# Patient Record
Sex: FEMALE | Race: BLACK OR AFRICAN AMERICAN | NOT HISPANIC OR LATINO | Employment: UNEMPLOYED | URBAN - METROPOLITAN AREA
[De-identification: names, ages, dates, MRNs, and addresses within clinical notes are randomized per-mention and may not be internally consistent; named-entity substitution may affect disease eponyms.]

---

## 2019-08-23 ENCOUNTER — OFFICE VISIT (OUTPATIENT)
Dept: OBGYN | Facility: CLINIC | Age: 64
End: 2019-08-23
Attending: OBSTETRICS & GYNECOLOGY
Payer: COMMERCIAL

## 2019-08-23 VITALS — SYSTOLIC BLOOD PRESSURE: 108 MMHG | HEART RATE: 65 BPM | DIASTOLIC BLOOD PRESSURE: 74 MMHG | WEIGHT: 156.7 LBS

## 2019-08-23 DIAGNOSIS — Z12.4 CERVICAL CANCER SCREENING: Primary | ICD-10-CM

## 2019-08-23 PROCEDURE — 87624 HPV HI-RISK TYP POOLED RSLT: CPT | Performed by: OBSTETRICS & GYNECOLOGY

## 2019-08-23 PROCEDURE — G0145 SCR C/V CYTO,THINLAYER,RESCR: HCPCS | Performed by: OBSTETRICS & GYNECOLOGY

## 2019-08-23 PROCEDURE — G0463 HOSPITAL OUTPT CLINIC VISIT: HCPCS

## 2019-08-23 PROCEDURE — G0123 SCREEN CERV/VAG THIN LAYER: HCPCS | Performed by: OBSTETRICS & GYNECOLOGY

## 2019-08-23 PROCEDURE — G0476 HPV COMBO ASSAY CA SCREEN: HCPCS | Performed by: OBSTETRICS & GYNECOLOGY

## 2019-08-23 RX ORDER — CETIRIZINE HYDROCHLORIDE 10 MG/1
10 TABLET ORAL DAILY
COMMUNITY
End: 2023-12-05

## 2019-08-23 NOTE — LETTER
9/18/2019         Sharon Mendoza   [address]  [address]  [facility] [address]        Dear Ms. Mendoza:    The results of your recent test(s) listed below were normal.     Results for orders placed or performed in visit on 08/23/19   HPV High Risk Types DNA Cervical   Result Value Ref Range    HPV Source [facility]     HPV 16 DNA Negative NEG^Negative    HPV 18 DNA Negative NEG^Negative    Other HR HPV Negative NEG^Negative    Final Diagnosis This patient's sample is negative for HPV DNA.     Specimen Description Cervical Cells    A pap thin layer screen   Result Value Ref Range    PAP NIL     Copath Report         Patient Name: SHARON MENDOZA  MR#: [id number]  Specimen #: [id number]  Collected: 8/23/2019  Received: 8/26/2019  Reported: 8/30/2019 10:00  Ordering Phy(s): ESTRELLA OLSON    For improved result formatting, select 'View Enhanced Report Format' under   Linked Documents section.    SPECIMEN/STAIN PROCESS:  Pap thin layer prep screening (Sure[facility])       Pap-Cyto x 1, HPV ordered x 1    SOURCE: Cervical, endocervical  ----------------------------------------------------------------   Pap thin layer prep screening (Sure[facility])  SPECIMEN ADEQUACY:  Satisfactory for evaluation.  Specimen was unable to be imaged on the   Project WBS Slide .  -Transformation zone component absent.    CYTOLOGIC INTERPRETATION:    Negative for intraepithelial lesion or malignancy    Electronically signed out by:  ANGEL Elliott (ASCP)    CLINICAL HISTORY:    Post Menopausal,    Papanicolaou Test Limitations:  Cervical cytology is a screening test with   limited sensitivity; regular  screening is cri tical for cancer prevention; Pap tests are primarily   effective for the diagnosis/prevention of  squamous cell carcinoma, not adenocarcinomas or other cancers.    The technical component of this testing was completed at the [facility], with the professional component  performed   at the [facility], [address],   [place] [address] ([phone])    COLLECTION SITE:  Client:  [facility]  Location: [place] (B)             Please note that test explanations are brief and do not reflect all diagnostic uses.  If you have any questions or concerns, please call the clinic at [phone].      Sincerely,      Leah Reynaga CNM

## 2019-08-23 NOTE — PROGRESS NOTES
Pelvic Pain Visit Note    Sharon Mendoza is a 64 year old y/o  (1  age 22 killed in [place]); all vaginal deliveries. She is here with her .  Her babies were full term; no weight known.  She denies VB. No abnormal discharge. She is not sexually acitve.She has seen a GYN about  at [facility] and reports being here as well . She goes to [facility].She reports feeling pain inside her vagina; like I'm about to pee. She also has lower abdominal pain; this seems to radiate up from her vagina. This has been going on for a couple of months. She also  has spontaneous loss of urine with urgency.  She has FERMÍN sometimes. This has been going on for years and she has accepted this part. She has no problem evacuating BM's.  She also notes a lump near her vagina. She hasn't had a pap for many years..  She is not comfortable telling me when she had intercourse last.  She is not currently sexually active.     ROS: not enough time to finish  Current Outpatient Medications   Medication Sig Dispense Refill     cetirizine (ZYRTEC) 10 MG tablet Take 10 mg by mouth daily       sulindac (CLINORIL) 200 MG tablet Take 1 tablet by mouth 2 times daily (with meals). 24 tablet 1     ACETAMINOPHEN PO Take  by mouth.       No Known Allergies    Past Medical History:   Diagnosis Date     Gastro-oesophageal reflux disease      No past surgical history on file.  No family history on file.  Social History     Socioeconomic History     Marital status:      Spouse name: Not on file     Number of children: Not on file     Years of education: Not on file     Highest education level: Not on file   Occupational History     Not on file   Social Needs     Financial resource strain: Not on file     Food insecurity:     Worry: Not on file     Inability: Not on file     Transportation needs:     Medical: Not on file     Non-medical: Not on file   Tobacco Use     Smoking status: Never Smoker     Smokeless tobacco: Never Used  "  Substance and Sexual Activity     Alcohol use: No     Drug use: No     Sexual activity: Not on file   Lifestyle     Physical activity:     Days per week: Not on file     Minutes per session: Not on file     Stress: Not on file   Relationships     Social connections:     Talks on phone: Not on file     Gets together: Not on file     Attends [employer] service: Not on file     Active member of club or organization: Not on file     Attends meetings of clubs or organizations: Not on file     Relationship status: Not on file     Intimate partner violence:     Fear of current or ex partner: Not on file     Emotionally abused: Not on file     Physically abused: Not on file     Forced sexual activity: Not on file   Other Topics Concern     Not on file   Social History Narrative     Not on file     Abd: soft, NT  Exam: Fairly gaping introitus, white discharge seen. Small/moderate cystocele and small rectocele note. The \"lump\" of her concern appears to be a torn labia minora ?from birthing.  Pelvic exam: normal cervix without lesions or tenderness, uterus normal size, adenxa normal in size without tenderness, pap smear done      Assessment/Plan: Sharon Mendoza is a 64 year old y/o with vaginal discomfort radiating to her lower abdomen. I feel that this is most likely due to her cystocele.  She was given options of 1)do nothing 2)pessary 3)surgery.  She really has learned to live with the spontaneous loss of urine; she was just concerned about her new vaginal symptoms which I tried to reassure her about.  She will probably choose option1; but will RTO if her symptoms worsen.    Check Pap smear.    I spent a total of 30  minutes face to face with Sharon during today's office visit. Over 50% of this time was spent counseling the patient and/or coordinating care regarding her concerns.      "

## 2019-08-23 NOTE — LETTER
2019       RE: Sharon Mendoza  [address]  [address]  [facility] [address]     Dear Colleague,    Thank you for referring your patient, Sharon Mendoza, to the [facility] at [facility]. Please see a copy of my visit note below.    Pelvic Pain Visit Note    Sharon Mendoza is a 64 year old y/o  (1  age 22 killed in [place]); all vaginal deliveries. She is here with her .  Her babies were full term; no weight known.  She denies VB. No abnormal discharge. She is not sexually acitve.She has seen a GYN about  at [facility] and reports being here as well . She goes to [facility].She reports feeling pain inside her vagina; like I'm about to pee. She also has lower abdominal pain; this seems to radiate up from her vagina. This has been going on for a couple of months. She also  has spontaneous loss of urine with urgency.  She has FERMÍN sometimes. This has been going on for years and she has accepted this part. She has no problem evacuating BM's.  She also notes a lump near her vagina. She hasn't had a pap for many years..  She is not comfortable telling me when she had intercourse last.  She is not currently sexually active.     ROS: not enough time to finish  Current Outpatient Medications   Medication Sig Dispense Refill     cetirizine (ZYRTEC) 10 MG tablet Take 10 mg by mouth daily       sulindac (CLINORIL) 200 MG tablet Take 1 tablet by mouth 2 times daily (with meals). 24 tablet 1     ACETAMINOPHEN PO Take  by mouth.       No Known Allergies    Past Medical History:   Diagnosis Date     Gastro-oesophageal reflux disease      No past surgical history on file.  No family history on file.  Social History     Socioeconomic History     Marital status:      Spouse name: Not on file     Number of children: Not on file     Years of education: Not on file     Highest education level: Not on file   Occupational History     Not on  "file   Social Needs     Financial resource strain: Not on file     Food insecurity:     Worry: Not on file     Inability: Not on file     Transportation needs:     Medical: Not on file     Non-medical: Not on file   Tobacco Use     Smoking status: Never Smoker     Smokeless tobacco: Never Used   Substance and Sexual Activity     Alcohol use: No     Drug use: No     Sexual activity: Not on file   Lifestyle     Physical activity:     Days per week: Not on file     Minutes per session: Not on file     Stress: Not on file   Relationships     Social connections:     Talks on phone: Not on file     Gets together: Not on file     Attends Hoahaoism service: Not on file     Active member of club or organization: Not on file     Attends meetings of clubs or organizations: Not on file     Relationship status: Not on file     Intimate partner violence:     Fear of current or ex partner: Not on file     Emotionally abused: Not on file     Physically abused: Not on file     Forced sexual activity: Not on file   Other Topics Concern     Not on file   Social History Narrative     Not on file     Abd: soft, NT  Exam: Fairly gaping introitus, white discharge seen. Small/moderate cystocele and small rectocele note. The \"lump\" of her concern appears to be a torn labia minora ?from birthing.  Pelvic exam: normal cervix without lesions or tenderness, uterus normal size, adenxa normal in size without tenderness, pap smear done    Assessment/Plan: Sharon Mendoza is a 64 year old y/o with vaginal discomfort radiating to her lower abdomen. I feel that this is most likely due to her cystocele.  She was given options of 1)do nothing 2)pessary 3)surgery.  She really has learned to live with the spontaneous loss of urine; she was just concerned about her new vaginal symptoms which I tried to reassure her about.  She will probably choose option1; but will RTO if her symptoms worsen.    Check Pap smear.    I spent a total of 30  minutes face to " face with Sharon during today's office visit. Over 50% of this time was spent counseling the patient and/or coordinating care regarding her concerns.      Again, thank you for allowing me to participate in the care of your patient.      Sincerely,    Chaz Green MD

## 2019-08-29 LAB
FINAL DIAGNOSIS: NORMAL
HPV HR 12 DNA CVX QL NAA+PROBE: NEGATIVE
HPV16 DNA SPEC QL NAA+PROBE: NEGATIVE
HPV18 DNA SPEC QL NAA+PROBE: NEGATIVE
SPECIMEN DESCRIPTION: NORMAL
SPECIMEN SOURCE CVX/VAG CYTO: NORMAL

## 2019-08-30 LAB
COPATH REPORT: NORMAL
PAP: NORMAL

## 2022-10-10 ENCOUNTER — TRANSCRIBE ORDERS (OUTPATIENT)
Dept: OTHER | Age: 67
End: 2022-10-10

## 2022-10-10 DIAGNOSIS — N32.81 OVERACTIVE BLADDER: Primary | ICD-10-CM

## 2023-06-01 ENCOUNTER — TRANSCRIBE ORDERS (OUTPATIENT)
Dept: OTHER | Age: 68
End: 2023-06-01

## 2023-06-01 DIAGNOSIS — R39.15 URINARY URGENCY: ICD-10-CM

## 2023-06-01 DIAGNOSIS — R32 LEAKING OF URINE: ICD-10-CM

## 2023-06-01 DIAGNOSIS — N39.3 STRESS INCONTINENCE: Primary | ICD-10-CM

## 2023-06-01 DIAGNOSIS — R33.9 INCOMPLETE EMPTYING OF BLADDER: ICD-10-CM

## 2023-07-18 ENCOUNTER — THERAPY VISIT (OUTPATIENT)
Dept: PHYSICAL THERAPY | Facility: CLINIC | Age: 68
End: 2023-07-18
Payer: COMMERCIAL

## 2023-07-18 DIAGNOSIS — R32 URINARY INCONTINENCE: Primary | ICD-10-CM

## 2023-07-18 DIAGNOSIS — M62.89 PELVIC FLOOR DYSFUNCTION: ICD-10-CM

## 2023-07-18 PROCEDURE — 97161 PT EVAL LOW COMPLEX 20 MIN: CPT | Mod: GP | Performed by: PHYSICAL THERAPIST

## 2023-07-18 PROCEDURE — 97110 THERAPEUTIC EXERCISES: CPT | Mod: GP | Performed by: PHYSICAL THERAPIST

## 2023-07-18 PROCEDURE — 97530 THERAPEUTIC ACTIVITIES: CPT | Mod: GP | Performed by: PHYSICAL THERAPIST

## 2023-07-18 PROCEDURE — 97535 SELF CARE MNGMENT TRAINING: CPT | Mod: GP | Performed by: PHYSICAL THERAPIST

## 2023-07-18 NOTE — PROGRESS NOTES
PHYSICAL THERAPY EVALUATION  Type of Visit: Evaluation    See electronic medical record for Abuse and Falls Screening details.    Subjective       Presenting condition or subjective complaint: urine leakagePatient reports with complaints of urinary incontinence that has been ongoing for about 10 years but is gradually getting worse.  She reports she may have prolapse, but denies heaviness or pressure in pelvis.  She has urinary incontinence that is worsening and happens randomly, once she gets urge she can't stop the urine flow.  She does sometimes have some urinary leakage in the bed about twice at night and one-two at night.  Denies bowel issues.  Denies sexual dysfunction.  Denies pain, except with full bladder.   Date of onset: 06/01/23 (provider referral date)    Relevant medical history: Bladder or bowel problems; Incontinence; Menopause; Neck injury   Dates & types of surgery:  6 vaginal deliveries     Prior diagnostic imaging/testing results:       Prior therapy history for the same diagnosis, illness or injury:        Prior Level of Function  Transfers: Independent  Ambulation: Independent  ADL: Independent  IADL: Childcare, Driving, Laundry, Meal preparation, Medication management, Work, Yard work    Living Environment  Social support: Alone   Type of home: 1 level   Stairs to enter the home: No       Ramp: No   Stairs inside the home: No       Help at home: Home management tasks (cooking, cleaning)  Equipment owned:       Employment:      Hobbies/Interests: walking, reading    Patient goals for therapy:      Pain assessment: See objective evaluation for additional pain details     Objective      PELVIC EVALUATION  ADDITIONAL HISTORY:  Sex assigned at birth: Female  Gender identity: Female    Pronouns: She/Her Hers      Bladder History:  Feels bladder filling: Yes  Triggers for feeling of inability to wait to go to the bathroom: No    How long can you wait to urinate:    Gets up at night to urinate:       Can stop the flow of urine when urinating:    Volume of urine usually released: Large   Other issues: Dribbling after urinating  Number of bladder infections in last 12 months:    Fluid intake per day:        Medications taken for bladder:       Activities causing urine leak: Hurrying to the bathroom due to a strong urge to urinate (pee)    Amount of urine typically leaked: large, runs down leg  Pads used to help with leaking: No        Bowel History:  Frequency of bowel movement:    Consistency of stool: Soft-formed    Ignores the urge to defecate: No  Other bowel issues:    Length of time spent trying to have a bowel movement:      Sexual Function History:  Sexual orientation:      Sexually active:    Lubrication used:      Pelvic pain:      Pain or difficulty with orgasms/erection/ejaculation:      State of menopause:    Hormone medications: No      Are you currently pregnant: No, Number of deliveries: 6, If you have delivered before, did you have any of these issues during delivery: Vaginal delivery, Have you been diagnosed with pelvic prolapse or abdominal separation: No, Do you get regular exercise: No    Discussed reason for referral regarding pelvic health needs and external/internal pelvic floor muscle examination with patient/guardian.  Opportunity provided to ask questions and verbal consent for assessment and intervention was given.    PAIN: Pain Level at Rest: 0/10  POSTURE: Standing Posture: Lordosis increased  LUMBAR SCREEN:   (Degrees) Left AROM Left PROM  Right AROM Right PROM   Hip Flexion       Hip Extension       Hip Abduction       Hip Adduction       Hip Internal Rotation       Hip External Rotation       Knee Flexion       Knee Extension       Lumbar Side glide 75% 75%   Lumbar Flexion 50%    Lumbar Extension Limited by lumbar extension posture    Pain:   End feel:   HIP SCREEN:  Strength:   Pain: - none + mild ++ moderate +++ severe  Strength Scale: 0-5/5 Left Right   Hip Flexion 4+ 4+    Hip Extension     Hip Abduction 4- 4   Hip Adduction     Hip Internal Rotation 5- 5-   Hip External Rotation 5- 5-   Knee Flexion 5 5   Knee Extension 5 5      Functional Strength Testing: SLS: > 10 sec each side     PELVIC/SI SCREEN:  Standing Forward Bend: negative     PAIN PROVOCATION TEST: WNL  PELVIS/SI SPECIAL TESTS: WNL  BREATHING SYMMETRY: Decreased rib cage mobility    PELVIC EXAM  External Visual Inspection:  At rest: Elevated perineal body  With voluntary pelvic floor contraction: Perineal elevation  Relaxation of PFM: Partial/delayed relaxation  With intra-abdominal pressure: Cough: Perineal descent  Valsalva: Perineal elevation  Bearing down as defecation: Perineal elevation    Integumentary:   Introitus: Unremarkable    External Digital Palpation per Perineum:   Ischiocavernosis: Unremarkable  Bulbo cavernosis: Unremarkable, Tightness  Transverse perineal: Unremarkable  Levator ani: Tightness, Tenderness    Scar:   Location/Type:   Mobility:     Internal Digital Palpation:  Per Vagina:  Tenderness  Tone: high  Digital Muscle Performance: P (Power): 2  E (Endurance): 3  F (Fast Twitch): 5  Compensations: Gluts  Relaxation Post-Contraction: Partial/delayed relaxation    Per Rectum:        Pelvic Organ Prolapse:       ABDOMINAL ASSESSMENT  Diastasis Rectus Abdominis (HEATHER):      Abdominal Activation/Strength:     Scar:   Location/Type:   Mobility:     Fascial Tension/Restriction/Tone:     BIOFEEDBACK:  Position:   Surface Electrodes:     Abdominals:     Perianals:       DERMATOMES:   DTR S:     Assessment & Plan   CLINICAL IMPRESSIONS  Medical Diagnosis: Stress incontinence (N39.3), Leaking of urine (R32), Urinary urgency (R39.15), Incomplete emptying of bladder (R33.9)    Treatment Diagnosis: pelvic floor dysfunction   Impression/Assessment: Patient is a 68 year old female with pelvic floor complaints.  The following significant findings have been identified: Pain, Decreased ROM/flexibility, Decreased  joint mobility, Decreased strength, Impaired muscle performance, and Decreased activity tolerance. These impairments interfere with their ability to perform self care tasks, work tasks, household mobility, and community mobility as compared to previous level of function.     Clinical Decision Making (Complexity):  Clinical Presentation: Stable/Uncomplicated  Clinical Presentation Rationale: based on medical and personal factors listed in PT evaluation  Clinical Decision Making (Complexity): Low complexity    PLAN OF CARE  Treatment Interventions:  Modalities: Biofeedback  Interventions: Manual Therapy, Neuromuscular Re-education, Therapeutic Activity, Therapeutic Exercise, Self-Care/Home Management    Long Term Goals     PT Goal 1  Goal Identifier: STG 1  Goal Description: Patient will decrease episodes of urinary incontinence to 0 times in a week  Rationale: to maximize safety and independence with performance of ADLs and functional tasks  Target Date: 10/10/23      Frequency of Treatment: every 2 weeks  Duration of Treatment: 12 weeks    Recommended Referrals to Other Professionals:  No further referral needed   Education Assessment:   Learner/Method: Patient;Listening;Demonstration    Risks and benefits of evaluation/treatment have been explained.   Patient/Family/caregiver agrees with Plan of Care.     Evaluation Time:     PT Eval, Low Complexity Minutes (74988): 25   Present: Yes: Language: Samoan , ID Number/Identifier: [id number]     Signing Clinician: Mechelle Rick, PT      [facility]                                                                                   OUTPATIENT PHYSICAL THERAPY      PLAN OF TREATMENT FOR OUTPATIENT REHABILITATION   Patient's Last Name, First Name, Sharon Monterroso YOB: 1955   Provider's Name   [facility]   Medical Record No.  [id number]     Onset Date: 06/01/23 (provider referral date)   Start of Care Date: 07/18/23     Medical Diagnosis:  Stress incontinence (N39.3), Leaking of urine (R32), Urinary urgency (R39.15), Incomplete emptying of bladder (R33.9)      PT Treatment Diagnosis:  pelvic floor dysfunction Plan of Treatment  Frequency/Duration: every 2 weeks/ 12 weeks    Certification date from 07/18/23 to 10/10/23         See note for plan of treatment details and functional goals     Mechelle Rick, PT                         I CERTIFY THE NEED FOR THESE SERVICES FURNISHED UNDER        THIS PLAN OF TREATMENT AND WHILE UNDER MY CARE .             Physician Signature               Date    X_____________________________________________________                    Referring Provider:  Raul Moran      Initial Assessment  See Epic Evaluation- Start of Care Date: 07/18/23                  [facility]                                                                                   OUTPATIENT PHYSICAL THERAPY      PLAN OF TREATMENT FOR OUTPATIENT REHABILITATION   Patient's Last Name, First Name, Sharon Monterroso YOB: 1955   Provider's Name   [facility]   Medical Record No.  [id number]     Onset Date: 06/01/23 (provider referral date)  Start of Care Date: 07/18/23     Medical Diagnosis:  Stress incontinence (N39.3), Leaking of urine (R32), Urinary urgency (R39.15), Incomplete emptying of bladder (R33.9)      PT Treatment Diagnosis:  pelvic floor dysfunction Plan of Treatment  Frequency/Duration: every 2 weeks/ 12 weeks    Certification date from 07/18/23 to 10/10/23         See note for plan of treatment details and functional goals     Mechelle Rick, RUCHI                         I CERTIFY THE NEED FOR THESE SERVICES FURNISHED UNDER        THIS PLAN OF TREATMENT AND WHILE UNDER MY CARE .             Physician Signature               Date    X_____________________________________________________                    Referring  Provider:  Raul Moran      Initial Assessment  See Epic Evaluation- Start of Care Date: 07/18/23

## 2023-07-27 PROBLEM — R32 URINARY INCONTINENCE: Status: ACTIVE | Noted: 2023-07-27

## 2023-07-27 PROBLEM — M62.89 PELVIC FLOOR DYSFUNCTION: Status: ACTIVE | Noted: 2023-07-27

## 2023-08-07 ENCOUNTER — THERAPY VISIT (OUTPATIENT)
Dept: PHYSICAL THERAPY | Facility: CLINIC | Age: 68
End: 2023-08-07
Payer: COMMERCIAL

## 2023-08-07 DIAGNOSIS — R32 URINARY INCONTINENCE: Primary | ICD-10-CM

## 2023-08-07 DIAGNOSIS — M62.89 PELVIC FLOOR DYSFUNCTION: ICD-10-CM

## 2023-08-07 PROCEDURE — 97110 THERAPEUTIC EXERCISES: CPT | Mod: GP | Performed by: PHYSICAL THERAPIST

## 2023-08-07 PROCEDURE — 97530 THERAPEUTIC ACTIVITIES: CPT | Mod: GP | Performed by: PHYSICAL THERAPIST

## 2023-09-12 ENCOUNTER — THERAPY VISIT (OUTPATIENT)
Dept: PHYSICAL THERAPY | Facility: CLINIC | Age: 68
End: 2023-09-12
Payer: COMMERCIAL

## 2023-09-12 DIAGNOSIS — R32 URINARY INCONTINENCE: Primary | ICD-10-CM

## 2023-09-12 DIAGNOSIS — M62.89 PELVIC FLOOR DYSFUNCTION: ICD-10-CM

## 2023-09-12 PROCEDURE — 97110 THERAPEUTIC EXERCISES: CPT | Mod: 59 | Performed by: PHYSICAL THERAPIST

## 2023-09-12 PROCEDURE — 97530 THERAPEUTIC ACTIVITIES: CPT | Mod: GP | Performed by: PHYSICAL THERAPIST

## 2023-10-03 ENCOUNTER — THERAPY VISIT (OUTPATIENT)
Dept: PHYSICAL THERAPY | Facility: CLINIC | Age: 68
End: 2023-10-03
Payer: COMMERCIAL

## 2023-10-03 DIAGNOSIS — M62.89 PELVIC FLOOR DYSFUNCTION: ICD-10-CM

## 2023-10-03 DIAGNOSIS — R32 URINARY INCONTINENCE: Primary | ICD-10-CM

## 2023-10-03 PROCEDURE — 97110 THERAPEUTIC EXERCISES: CPT | Mod: GP | Performed by: PHYSICAL THERAPIST

## 2023-10-03 PROCEDURE — 97530 THERAPEUTIC ACTIVITIES: CPT | Mod: GP | Performed by: PHYSICAL THERAPIST

## 2023-10-11 ENCOUNTER — OFFICE VISIT (OUTPATIENT)
Dept: OBGYN | Facility: CLINIC | Age: 68
End: 2023-10-11
Attending: OBSTETRICS & GYNECOLOGY
Payer: COMMERCIAL

## 2023-10-11 VITALS
HEART RATE: 76 BPM | HEIGHT: 64 IN | DIASTOLIC BLOOD PRESSURE: 69 MMHG | SYSTOLIC BLOOD PRESSURE: 101 MMHG | BODY MASS INDEX: 27.68 KG/M2 | WEIGHT: 162.1 LBS

## 2023-10-11 DIAGNOSIS — N90.89 VULVAR SKIN TAG: Primary | ICD-10-CM

## 2023-10-11 PROCEDURE — 99213 OFFICE O/P EST LOW 20 MIN: CPT | Mod: GC | Performed by: OBSTETRICS & GYNECOLOGY

## 2023-10-11 PROCEDURE — G0463 HOSPITAL OUTPT CLINIC VISIT: HCPCS | Performed by: OBSTETRICS & GYNECOLOGY

## 2023-10-11 NOTE — PROGRESS NOTES
"[facility]  Gynecology Visit    Reason for Visit: vaginal mass    HPI:    Sharon Mendoza is a 68 year old , here for evaluation of vaginal mass or prolapse. Tissue that came out a long time ago \"in private area\" now causing itching. Feels like it is out all of the time. Feels like it is always visible. Denies vaginal bleeding. Gotten worse with age. Has urinary incontinence, follows with a doctor for this. Not sexually active.      Lab Results   Component Value Date    PAP NIL 2019       OBHx  OB History    Para Term  AB Living   6 0 0 0 0 6   SAB IAB Ectopic Multiple Live Births   0 0 0 0 0      # Outcome Date GA Lbr Surinder/2nd Weight Sex Delivery Anes PTL Lv   6             5             4             3             2             1                 Past Medical History:   Diagnosis Date    Gastro-oesophageal reflux disease        History reviewed. No pertinent surgical history.      Current Outpatient Medications:     ACETAMINOPHEN EXTRA STRENGTH 500 MG tablet, , Disp: , Rfl:     ACETAMINOPHEN PO, Take  by mouth. (Patient not taking: Reported on 10/11/2023), Disp: , Rfl:     azelastine (ASTEPRO) 0.15 % nasal spray, , Disp: , Rfl:     cetirizine (ZYRTEC) 10 MG tablet, Take 10 mg by mouth daily (Patient not taking: Reported on 10/11/2023), Disp: , Rfl:     cholecalciferol (D3-50) 1250 mcg (78402 units) capsule, Take 50,000 Units by mouth (Patient not taking: Reported on 10/11/2023), Disp: , Rfl:      MG tablet, , Disp: , Rfl:     famotidine (PEPCID) 20 MG tablet, , Disp: , Rfl:     ibuprofen (ADVIL/MOTRIN) 600 MG tablet, , Disp: , Rfl:     olopatadine (PATANOL) 0.1 % ophthalmic solution, , Disp: , Rfl:     omeprazole (PRILOSEC) 40 MG DR capsule, , Disp: , Rfl:     SM CLOTRIMAZOLE VAGINAL 1 % vaginal cream, , Disp: , Rfl:     sulindac (CLINORIL) 200 MG tablet, Take 1 tablet by mouth 2 times daily (with meals). (Patient not taking: Reported on " "10/11/2023), Disp: 24 tablet, Rfl: 1    VITAMIN D3 50 MCG ( [place]) tablet, , Disp: , Rfl:     Allergies   Allergen Reactions    Seasonal Allergies        History reviewed. No pertinent family history.    Social History     Socioeconomic History    Marital status:      Spouse name: Not on file    Number of children: Not on file    Years of education: Not on file    Highest education level: Not on file   Occupational History    Not on file   Tobacco Use    Smoking status: Never    Smokeless tobacco: Never   Substance and Sexual Activity    Alcohol use: No    Drug use: No    Sexual activity: Not Currently   Other Topics Concern    Not on file   Social History Narrative    Not on file     Social Determinants of Health     Financial Resource Strain: Not on file   Food Insecurity: Not on file   Transportation Needs: Not on file   Physical Activity: Not on file   Stress: Not on file   Social Connections: Not on file   Interpersonal Safety: Not on file   Housing Stability: Not on file       ROS:  ROS negative except as noted in HPI    Physical Exam  /69   Pulse 76   Ht 1.626 m (5' 4\")   Wt 73.5 kg (162 lb 1.6 oz)   BMI 27.82 kg/m    Gen: Well-appearing, NAD  HEENT: Normocephalic, atraumatic  Ext: No LE edema, extremities warm and well perfused    Pelvic:  Normal appearing external female genitalia. 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 Normal hair distribution. 1 cm pedunculated skin tag present at left labia minora. Minimal movement of anterior vaginal wall with valsalva, no prolapse that extends beyond introitus.     Assessment/Plan:  Sharon Mendoza is a 68 year old  female here for evaluation of a skin tag that is causing irritation. Discussed that this is a benign finding, and that it can be removed in the office or in the OR under sedation. Patient would like it removed in the office, at a follow up appointment. Offered reassurance that it was nothing dangerous. No evidence of pelvis organ " prolapse on exam today.     Return to clinic for removal of skin tag with local anesthesia.     Staffed and seen with Dr. Khan.     Luann Nice MD  Obstetrics and Gynecology, PGY-1  10/11/23 4:26 PM

## 2023-11-07 ENCOUNTER — THERAPY VISIT (OUTPATIENT)
Dept: PHYSICAL THERAPY | Facility: CLINIC | Age: 68
End: 2023-11-07
Payer: COMMERCIAL

## 2023-11-07 DIAGNOSIS — R32 URINARY INCONTINENCE: Primary | ICD-10-CM

## 2023-11-07 DIAGNOSIS — M62.89 PELVIC FLOOR DYSFUNCTION: ICD-10-CM

## 2023-11-07 PROCEDURE — 97110 THERAPEUTIC EXERCISES: CPT | Mod: GP | Performed by: PHYSICAL THERAPIST

## 2023-11-07 PROCEDURE — 97530 THERAPEUTIC ACTIVITIES: CPT | Mod: GP | Performed by: PHYSICAL THERAPIST

## 2023-11-07 PROCEDURE — 97140 MANUAL THERAPY 1/> REGIONS: CPT | Mod: GP | Performed by: PHYSICAL THERAPIST

## 2023-11-22 NOTE — PROGRESS NOTES
11/07/23 0500   Appointment Info   Signing clinician's name / credentials Mechelle Rick, PT, DPT, WCS   Total/Authorized Visits E&T   Visits Used 5   Medical Diagnosis Stress incontinence (N39.3), Leaking of urine (R32), Urinary urgency (R39.15), Incomplete emptying of bladder (R33.9)   PT Tx Diagnosis pelvic floor dysfunction   Other pertinent information difficult to get female , got disconnected two times from  services   Quick Adds Pelvic Consent;Certification   Progress Note/Certification   Start of Care Date 07/18/23   Onset of illness/injury or Date of Surgery 06/01/23  (provider referral date)   Therapy Frequency every 2 weeks   Predicted Duration 12 weeks   Certification date from 10/10/23   Certification date to 01/22/24       Present Yes   Preferred Language EM    ID [id number]   PT Goal 1   Goal Identifier STG 1   Goal Description Patient will decrease episodes of urinary incontinence to 0 times in a week   Rationale to maximize safety and independence with performance of ADLs and functional tasks   Target Date 10/10/23   Subjective Report   Subjective Report Patient reports that urinary incontinence is worse.  She does note that she was doing better with exercises in the beginning, but feels like she is still doing exercise and having issues.  She reports leaking every time that she goes to the bathroom, always has some leakage.  She reports that it feels like her bladder won't close.   Treatment Interventions (PT)   Interventions Therapeutic Procedure/Exercise;Therapeutic Activity;Self Care/Home Management;Manual Therapy   Therapeutic Procedure/Exercise   Therapeutic Procedures: strength, endurance, ROM, flexibillity minutes (79809) 10   Therapeutic Procedures Ther Proc 2   Ther Proc 2 Child's pose   Ther Proc 2 - Details 30 sec x 3 cues for breathing in to butt   PTRx Ther Proc 1 Pelvic Floor Muscle Strengthening Basic   PTRx Ther Proc 1 -  Details No Notes   PTRx Ther Proc 2 All 4s Cat Cow   PTRx Ther Proc 2 - Details x 15 for neutral spine   PTRx Ther Proc 3 Extended Beata Pose   PTRx Ther Proc 4 All 4s Arm Lift   PTRx Ther Proc 4 - Details TrA hold 5 sec x 15   PTRx Ther Proc 5 Bridging #1   PTRx Ther Proc 5 - Details TrA hold 5 sec x 15   PTRx Ther Proc 6 Double Knee to Chest   PTRx Ther Proc 6 - Details with PT overpressure x 15   PTRx Ther Proc 7 Clamshell Feet Apart   PTRx Ther Proc 7 - Details x 15 each side cues for proper glute facilitation   Skilled Intervention PFM, core, and hip coordination and training   Patient Response/Progress tolerates well   Therapeutic Activity   Therapeutic Activities: dynamic activities to improve functional performance minutes (77116) 20   Therapeutic Activities Ther Act 2;Ther Act 3;Ther Act 4;Ther Act 5   Ther Act 1 Pelvic floor muscles   Ther Act 1 - Details reviewed potential stressors and need for PFM relaxation, positive self talk, trying to relax PFM throughout the day   Ther Act 2 Urge suppression   Ther Act 2 - Details reviewed urge suppression, need for positive self talk   Ther Act 3 Body scan   Ther Act 3 - Details discussed need for relaxation of PFM throughout the day   Ther Act 4 Abdominal tension   Ther Act 4 - Details discussed relaxing abdomen and decreasing pressure down through pelvic floor   Ther Act 5 Fluid intake   Ther Act 5 - Details discussed proper fluid intake, adding more water to daily intake   PTRx Ther Act 1 Diaphragmatic Breathing   PTRx Ther Act 1 - Details No Notes   PTRx Ther Act 2 Relaxed Awareness of Pelvic Floor Handout   PTRx Ther Act 2 - Details No Notes   PTRx Ther Act 3 Bladder Diary   PTRx Ther Act 3 - Details No Notes   Skilled Intervention patient education   Patient Response/Progress understands and ask appropriate questions   Manual Therapy   Manual Therapy: Mobilization, MFR, MLD, friction massage minutes (16983) 8   Manual Therapy Manual Therapy 2   Manual  Therapy 1 MET   Manual Therapy 1 - Details R anterior innominate rotation   Skilled Intervention soft tissue mobility   Patient Response/Progress tolerates well   Manual Therapy 2 MFR   Manual Therapy 2 - Details to B LQ over bladder using stacking   Intervention (Other)   PTRx Other  1 Urge Incontinence Suppression Techniques   PTRx Other 1 - Details No Notes   Education   Learner/Method Patient;Listening;Demonstration   Plan   Home program given PTRx   Comments   Pelvic Health Informed Consent Statement Discussed with patient/guardian reason for referral regarding pelvic health needs and external/internal pelvic floor muscle examination.  Opportunity provided to ask questions and verbal consent for assessment and intervention was given.   Total Session Time   Timed Code Treatment Minutes 38   Total Treatment Time (sum of timed and untimed services) 38       [facility]                                                                                   OUTPATIENT PHYSICAL THERAPY    PLAN OF TREATMENT FOR OUTPATIENT REHABILITATION   Patient's Last Name, First Name, Sharon Monterroso YOB: 1955   Provider's Name   [facility]   Medical Record No.  [id number]     Onset Date: 06/01/23 (provider referral date)  Start of Care Date: 07/18/23     Medical Diagnosis:  Stress incontinence (N39.3), Leaking of urine (R32), Urinary urgency (R39.15), Incomplete emptying of bladder (R33.9)      PT Treatment Diagnosis:  pelvic floor dysfunction Plan of Treatment  Frequency/Duration: every 2 weeks/ 12 weeks    Certification date from 10/10/23 to 01/22/24         See note for plan of treatment details and functional goals     Mechelle Rick, PT                         I CERTIFY THE NEED FOR THESE SERVICES FURNISHED UNDER        THIS PLAN OF TREATMENT AND WHILE UNDER MY CARE .             Physician Signature                Date    X_____________________________________________________                  Referring Provider:  Raul Moran    Initial Assessment  See Epic Evaluation- Start of Care Date: 07/18/23

## 2023-12-05 ENCOUNTER — OFFICE VISIT (OUTPATIENT)
Dept: OBGYN | Facility: CLINIC | Age: 68
End: 2023-12-05
Payer: COMMERCIAL

## 2023-12-05 VITALS
SYSTOLIC BLOOD PRESSURE: 119 MMHG | HEART RATE: 74 BPM | DIASTOLIC BLOOD PRESSURE: 77 MMHG | WEIGHT: 161 LBS | BODY MASS INDEX: 27.64 KG/M2

## 2023-12-05 DIAGNOSIS — N90.89 VULVAR LESION: Primary | ICD-10-CM

## 2023-12-05 PROCEDURE — 56605 BIOPSY OF VULVA/PERINEUM: CPT

## 2023-12-05 PROCEDURE — 88305 TISSUE EXAM BY PATHOLOGIST: CPT | Mod: TC

## 2023-12-05 PROCEDURE — 11200 RMVL SKIN TAGS UP TO&INC 15: CPT | Mod: GC | Performed by: STUDENT IN AN ORGANIZED HEALTH CARE EDUCATION/TRAINING PROGRAM

## 2023-12-05 PROCEDURE — 88305 TISSUE EXAM BY PATHOLOGIST: CPT | Mod: 26 | Performed by: STUDENT IN AN ORGANIZED HEALTH CARE EDUCATION/TRAINING PROGRAM

## 2023-12-05 ASSESSMENT — PAIN SCALES - GENERAL: PAINLEVEL: MILD PAIN (2)

## 2023-12-05 NOTE — PATIENT INSTRUCTIONS
Instructions    Rinse with cool water after voiding and defecation.   Keep wound dry for 24 hours then may shower.   Take OTC pain medications as needed.     Call if redness, swelling, severe pain or any other concerns.

## 2023-12-05 NOTE — LETTER
"2023       RE: Sharon Mendoza  [address]  [address]  [place] [address]     Dear Colleague,    Thank you for referring your patient, Sharon Mendoza, to the [facility]. Please see a copy of my visit note below.    [facility]  Gynecology Clinic Follow Up Visit    Date: 2023    Seen with a [place]     SUBJECTIVE     Sharon Mendoza is a 68 year old here today for skin tag removal. Patient was seen in clinic on 10/11/23 for evaluation of a labial skin tag. Reported at this time that this has been rubbing on her clothing/occasionally itching. Was counseled on options for management and requests in office removal today.     Patient otherwise has no additional complaints today.     Gynecology History:  No LMP recorded. Patient is postmenopausal.  Menses: postmenopausal  Contraception: n/a  PAP smear history:  PAP   Date Value Ref Range Status   2019 NIL  Final       OBJECTIVE     /77   Pulse 74   Wt 73 kg (161 lb)   BMI 27.64 kg/m    Gen: Well-appearing, NAD. Female Family member in room.   CV:        Regular rate Well perfused;   Pulm:  Normal respiratory effort and rate  Pelvic:   Small/possibly absent clitoral paredes. Otherwise grossly normal appearing external genitalia with the exception of a small appx 1cm skin tag to the superior aspect of the left labia minora. Skin tag non tender to palpation. Does not appear erythematous, or swollen. Scant Normal hair distribution, appropriate for age.     Procedure:   Vulvar skin tag removal:    Time Out - \"Pause for the Cause\"  Just before the procedure begins, through verbal and active participation of team members, verify:   Initials   Patient Name OOA   Patient  OOA   Procedure to be performed OOA     Preoperative Diagnosis: Left Labial Skin tag   Postoperative Diagnosis:  same    Consent:  Using a professional " telephone [facility] , the  risks, benefits of treatment, and alternate forms of evaluation were discussed.  Patient's questions were elicited and answered.  Written consent signed and scanned    Skin Preparation:  Betadine    Anesthesia: Local 1% Lidocaine with epinephrine     Patient was positioned in dorsal lithotomy on examination able.  Skin tag was identified and area was cleaned with Betadine.  Approximately 3 cc of 1% lidocaine with epinephrine was injected at base of skin tag.  A scalpel was then used to excise skin tag.  Bleeding from excised area was controlled with silver nitrate and pressure.  Vaseline was placed over this area.  Excision site hemostatic on final evaluation.    EBL: Minimal     Complications:  None    Specimens sent to Pathology.    Tolerance of Procedure: Patient did tolerate the procedure well.      ASSESSMENT & PLAN     Sharon Mendoza is a 68 year old y/o here today for an in office left labial skin tag removal.  Procedure as above.  Patient tolerated this well.  Discussed with patient aftercare including keeping area generally clean.  Provided patient with Vaseline and clean 4 x 4 pads to use as needed.  Discussed return precautions with patient at length.  All questions were answered and addressed.  Excised skin tag sent to pathology.  Clinical gynecology team to call patient with pathology results. Return to clinic PRN.     Staffed with Dr. Gray who was present and scrubbed for the above procedure.    Susanne Palomares DO, MS  OBGYN Resident, PGY3  [facility]  12/05/2023 4:19 PM    Patient was seen by the resident in continuity of care clinic.  I reviewed the patient's presentation and history and was present for the patient's exam and procedure.  The patient's assessment and plan were made jointly.      Jen Gray MD

## 2023-12-05 NOTE — PROGRESS NOTES
"[facility] [place]  Gynecology Clinic Follow Up Visit    Date: 2023    Seen with a [place]     SUBJECTIVE     Sharon Mendoza is a 68 year old here today for skin tag removal. Patient was seen in clinic on 10/11/23 for evaluation of a labial skin tag. Reported at this time that this has been rubbing on her clothing/occasionally itching. Was counseled on options for management and requests in office removal today.     Patient otherwise has no additional complaints today.     Gynecology History:  No LMP recorded. Patient is postmenopausal.  Menses: postmenopausal  Contraception: n/a  PAP smear history:  PAP   Date Value Ref Range Status   2019 NIL  Final       OBJECTIVE     /77   Pulse 74   Wt 73 kg (161 lb)   BMI 27.64 kg/m    Gen: Well-appearing, NAD. Female Family member in room.   CV:        Regular rate Well perfused;   Pulm:  Normal respiratory effort and rate  Pelvic:   Small/possibly absent clitoral paredes. Otherwise grossly normal appearing external genitalia with the exception of a small appx 1cm skin tag to the superior aspect of the left labia minora. Skin tag non tender to palpation. Does not appear erythematous, or swollen. Scant Normal hair distribution, appropriate for age.     Procedure:   Vulvar skin tag removal:    Time Out - \"Pause for the Cause\"  Just before the procedure begins, through verbal and active participation of team members, verify:   Initials   Patient Name OOA   Patient  OOA   Procedure to be performed OOA     Preoperative Diagnosis: Left Labial Skin tag   Postoperative Diagnosis:  same    Consent:  Using a professional telephone [place] , the  risks, benefits of treatment, and alternate forms of evaluation were discussed.  Patient's questions were elicited and answered.  Written consent signed and scanned    Skin Preparation:  Betadine    Anesthesia: Local 1% Lidocaine with epinephrine     Patient was positioned in " dorsal lithotomy on examination able.  Skin tag was identified and area was cleaned with Betadine.  Approximately 3 cc of 1% lidocaine with epinephrine was injected at base of skin tag.  A scalpel was then used to excise skin tag.  Bleeding from excised area was controlled with silver nitrate and pressure.  Vaseline was placed over this area.  Excision site hemostatic on final evaluation.    EBL: Minimal     Complications:  None    Specimens sent to Pathology.    Tolerance of Procedure: Patient did tolerate the procedure well.      ASSESSMENT & PLAN     Sharon Mendoza is a 68 year old y/o here today for an in office left labial skin tag removal.  Procedure as above.  Patient tolerated this well.  Discussed with patient aftercare including keeping area generally clean.  Provided patient with Vaseline and clean 4 x 4 pads to use as needed.  Discussed return precautions with patient at length.  All questions were answered and addressed.  Excised skin tag sent to pathology.  Clinical gynecology team to call patient with pathology results. Return to clinic PRN.     Staffed with Dr. Gray who was present and scrubbed for the above procedure.    Susanne Palomares DO, MS  OBGYN Resident, PGY3  [facility]  12/05/2023 4:19 PM    Patient was seen by the resident in continuity of care clinic.  I reviewed the patient's presentation and history and was present for the patient's exam and procedure.  The patient's assessment and plan were made jointly.      Jen Gray MD

## 2023-12-08 LAB
PATH REPORT.COMMENTS IMP SPEC: NORMAL
PATH REPORT.COMMENTS IMP SPEC: NORMAL
PATH REPORT.FINAL DX SPEC: NORMAL
PATH REPORT.GROSS SPEC: NORMAL
PATH REPORT.MICROSCOPIC SPEC OTHER STN: NORMAL
PATH REPORT.RELEVANT HX SPEC: NORMAL
PHOTO IMAGE: NORMAL

## 2024-01-29 ENCOUNTER — TELEPHONE (OUTPATIENT)
Dept: PHYSICAL THERAPY | Facility: CLINIC | Age: 69
End: 2024-01-29

## 2024-08-29 ENCOUNTER — OFFICE VISIT (OUTPATIENT)
Dept: OBGYN | Facility: CLINIC | Age: 69
End: 2024-08-29
Payer: COMMERCIAL

## 2024-08-29 ENCOUNTER — DOCUMENTATION ONLY (OUTPATIENT)
Dept: OBGYN | Facility: CLINIC | Age: 69
End: 2024-08-29

## 2024-08-29 VITALS
TEMPERATURE: 96.5 F | BODY MASS INDEX: 28.97 KG/M2 | DIASTOLIC BLOOD PRESSURE: 80 MMHG | OXYGEN SATURATION: 100 % | HEIGHT: 62 IN | SYSTOLIC BLOOD PRESSURE: 119 MMHG | HEART RATE: 73 BPM | WEIGHT: 157.4 LBS

## 2024-08-29 DIAGNOSIS — Z00.00 HEALTH CARE MAINTENANCE: ICD-10-CM

## 2024-08-29 DIAGNOSIS — N39.46 MIXED INCONTINENCE: ICD-10-CM

## 2024-08-29 DIAGNOSIS — N32.81 OAB (OVERACTIVE BLADDER): Primary | ICD-10-CM

## 2024-08-29 LAB
ALBUMIN UR-MCNC: NEGATIVE MG/DL
APPEARANCE UR: CLEAR
BACTERIA #/AREA URNS HPF: ABNORMAL /HPF
BILIRUB UR QL STRIP: NEGATIVE
COLOR UR AUTO: YELLOW
GLUCOSE UR STRIP-MCNC: NEGATIVE MG/DL
HGB UR QL STRIP: NEGATIVE
KETONES UR STRIP-MCNC: NEGATIVE MG/DL
LEUKOCYTE ESTERASE UR QL STRIP: NEGATIVE
NITRATE UR QL: NEGATIVE
PH UR STRIP: 6 [PH] (ref 5–7)
RBC #/AREA URNS AUTO: ABNORMAL /HPF
SP GR UR STRIP: <=1.005 (ref 1–1.03)
SQUAMOUS #/AREA URNS AUTO: ABNORMAL /LPF
UROBILINOGEN UR STRIP-ACNC: 0.2 E.U./DL
WBC #/AREA URNS AUTO: ABNORMAL /HPF

## 2024-08-29 PROCEDURE — 99204 OFFICE O/P NEW MOD 45 MIN: CPT | Mod: 25 | Performed by: OBSTETRICS & GYNECOLOGY

## 2024-08-29 PROCEDURE — 81001 URINALYSIS AUTO W/SCOPE: CPT | Performed by: OBSTETRICS & GYNECOLOGY

## 2024-08-29 PROCEDURE — 51798 US URINE CAPACITY MEASURE: CPT | Performed by: OBSTETRICS & GYNECOLOGY

## 2024-08-29 RX ORDER — OXYBUTYNIN CHLORIDE 5 MG/1
5 TABLET, EXTENDED RELEASE ORAL DAILY
Qty: 90 TABLET | Refills: 0 | Status: SHIPPED | OUTPATIENT
Start: 2024-08-29 | End: 2024-11-27

## 2024-08-29 RX ORDER — MIRABEGRON 25 MG/1
25 TABLET, FILM COATED, EXTENDED RELEASE ORAL DAILY
Qty: 90 TABLET | Refills: 0 | Status: SHIPPED | OUTPATIENT
Start: 2024-08-29 | End: 2024-08-29

## 2024-08-29 ASSESSMENT — ANXIETY QUESTIONNAIRES
6. BECOMING EASILY ANNOYED OR IRRITABLE: NOT AT ALL
1. FEELING NERVOUS, ANXIOUS, OR ON EDGE: NOT AT ALL
7. FEELING AFRAID AS IF SOMETHING AWFUL MIGHT HAPPEN: NOT AT ALL
3. WORRYING TOO MUCH ABOUT DIFFERENT THINGS: NOT AT ALL
GAD7 TOTAL SCORE: 0
5. BEING SO RESTLESS THAT IT IS HARD TO SIT STILL: NOT AT ALL
2. NOT BEING ABLE TO STOP OR CONTROL WORRYING: NOT AT ALL
IF YOU CHECKED OFF ANY PROBLEMS ON THIS QUESTIONNAIRE, HOW DIFFICULT HAVE THESE PROBLEMS MADE IT FOR YOU TO DO YOUR WORK, TAKE CARE OF THINGS AT HOME, OR GET ALONG WITH OTHER PEOPLE: NOT DIFFICULT AT ALL
GAD7 TOTAL SCORE: 0

## 2024-08-29 ASSESSMENT — PATIENT HEALTH QUESTIONNAIRE - PHQ9
5. POOR APPETITE OR OVEREATING: NOT AT ALL
SUM OF ALL RESPONSES TO PHQ QUESTIONS 1-9: 1

## 2024-08-29 NOTE — PROGRESS NOTES
Per MD request, escorted pt to lab to leave UA/UC for testing. Returned to clinic room and assessed pt with bladder scanner for post-void residual which resulted the highest number of 46mls.   Since less than 100mls, per MD verified with pt that she should start her oxybutynin.   Pt asked if PRN or every day.   Reviewed instructions to take medication 1 tablet daily (not PRN), but will double check with MD and if she says differently will notify her. Reviewed cost estimate and where it was sent to. Reminded pt that this is a XR tablet so to make sure to not crush/break up tablet.     Reviewed basic side effects that can occur like headache, dizziness, constipation/diarrhea. Explained for her to contact clinic with any new symptoms, or having issues with urinary system like painful/difficulty urinating.   Pt wanting to see Dr. Wilhelm regularly and sooner but reviewed schedule and unable to book sooner, placed on wait list, pt aware we will call if sooner appointment.   Let pt know will notify her with an  when her urine testing is back.     MD verified plan above and had no concerns at this time.  Pt verbalized understanding, in agreement with plan, and voiced no further questions.  Chetna Plata RN on 8/29/2024 at 10:26 AM  MANOHAR ABURTO

## 2024-08-29 NOTE — PROGRESS NOTES
GYN Progress Note     CC: Over active bladder     HISTORY OF PRESENT ILLNESS:    Sharon Mendoza is a 69 year old  who presents for urinary urgency and incontinence. She has previously seen urology in 2022 and diagnosed with overactive bladder. Treatment options were discussed including pelvic floor PT, avoidance of bladder irritants and medications. She initially elected to pursue pelvic floor PT but it doesn't look like she every established care with them (but did do PT for other concerns in the past). She presents today to discuss alternative options. Currently, she is voiding every 1-2 hours during the day time and is getting up 2x per night to void. She will feel sudden urge to void and have to rush to the bathroom. She sometimes has leaking with this and will also sometimes have leaking with cough/sneeze or activity but right now the urgency is more bothersome. She denies dysuria. She does feel like she is emptying her bladder. She denies excessive fluid intake during the day time or more than 1-2 caffeinated drinks per day.     OB History    Para Term  AB Living   6 0 0 0 0 6   SAB IAB Ectopic Multiple Live Births   0 0 0 0 0      # Outcome Date GA Lbr Surinder/2nd Weight Sex Type Anes PTL Lv   6             5             4             3             2             1                      GYN HISTORY:  She denies hx of abnormal pap smears, NIL pap with negative HPV in 2019 is documented in her chart but no other paps. We discussed that to exit pap smear screening at age 66, we need to have two normal co-tests or three paps documented with no hx of LISA 2/3 and that we haven't met that criteria, she declines pap today.   She completed menopause in her 50s, no HRT use, no PMB      PAST MEDICAL HISTORY:  Past Medical History:   Diagnosis Date    Gastro-oesophageal reflux disease             PAST SURGICAL HISTORY:  Past Surgical History:   Procedure Laterality  "Date    NO HISTORY OF SURGERY            CURRENT MEDICATIONS:   Current Outpatient Medications   Medication Sig Dispense Refill    oxyBUTYnin ER (DITROPAN XL) 5 MG 24 hr tablet Take 1 tablet (5 mg) by mouth daily. 90 tablet 0            ALLERGIES:  Seasonal allergies         SOCIAL HISTORY:  Social History     Tobacco Use    Smoking status: Never    Smokeless tobacco: Never   Substance Use Topics    Alcohol use: No    Drug use: No            FAMILY HISTORY:  Family History   Problem Relation Age of Onset    Breast Cancer No family hx of     Ovarian Cancer No family hx of     Colon Cancer No family hx of             REVIEW OF SYSTEMS:  See HPI      PHYSICAL EXAMINATION:  VS:/80   Pulse 73   Temp (!) 96.5  F (35.8  C) (Temporal)   Ht 1.575 m (5' 2\")   Wt 71.4 kg (157 lb 6.4 oz)   SpO2 100%   Breastfeeding No   BMI 28.79 kg/m    Body mass index is 28.79 kg/m .       General: Patient alert and oriented, no acute distress  CV: no peripheral edema or cyanosis  Resp: normal respiratory effort and equal lung expansion  : patient declines   Ext: non-tender, no edema    PVR done via bladder scanner and was 46 mL    Laboratory values:  Component      Latest Ref Rng 2024  9:30 AM   Color Urine      Colorless, Straw, Light Yellow, Yellow  Yellow    Appearance Urine      Clear  Clear    Glucose Urine      Negative mg/dL Negative    Bilirubin Urine      Negative  Negative    Ketones Urine      Negative mg/dL Negative    Specific Gravity Urine      1.003 - 1.035  <=1.005    Blood Urine      Negative  Negative    pH Urine      5.0 - 7.0  6.0    Protein Albumin Urine      Negative mg/dL Negative    Urobilinogen Urine      0.2, 1.0 E.U./dL 0.2    Nitrite Urine      Negative  Negative    Leukocyte Esterase Urine      Negative  Negative        Imaging findings:        ASSESSMENT:  Sharon Mendoza is a 69 year old  who presents for evaluation of the following concerns:     PLAN:  (N32.81) OAB (overactive bladder)  " (primary encounter diagnosis)  -We discussed treatment options and she was most interested in trying an oral medication. WE reviewed risks/benefits and typical side effects. Initially I recommended mirabegron due to lower incidence of expected side effects but this was not covered by her insurance so will trial oxybutynin ER.   -PVR and UA negative   -Also discussed the option for referral to pelvic floor PT and/or to re-establish with urology but she declines at this time.   Plan: oxyBUTYnin ER (DITROPAN XL) 5 MG 24 hr tablet,         DISCONTINUED: mirabegron (MYRBETRIQ) 25 MG 24         hr tablet            (Z00.00) Health care maintenance  -Patient over due for multiple oanh care screenings, unable to adequately  the patient due to time limitations but referral for PCP was placed   -We did discuss pap smear screenings and I recommend one additional pap with HPV prior to exiting cervical cancer screening which she declines today   Plan: Primary Care Referral            (N39.46) Mixed incontinence  -Will start with treatment for OAB as noted above as that seems to be her primary concern, she is aware of option for referral for pelvic floor PT and urology but declines at this time   Plan: UA with Microscopic reflex to Culture - lab         collect, UA Microscopic with Reflex to Culture          Dispo: RTC in 6 weeks for pelvic exam and to reassess symptoms on medical therapy   40 minutes spend on date of encounter doing chart review, performing the history and physical exam, and counseling the patient. A [employer] phone  was used throughout the entire encounter.     Michelle Wilhelm MD

## 2024-09-04 ENCOUNTER — APPOINTMENT (OUTPATIENT)
Dept: INTERPRETER SERVICES | Facility: CLINIC | Age: 69
End: 2024-09-04
Payer: COMMERCIAL

## 2025-03-03 ENCOUNTER — OFFICE VISIT (OUTPATIENT)
Dept: OBGYN | Facility: CLINIC | Age: 70
End: 2025-03-03
Attending: OBSTETRICS & GYNECOLOGY
Payer: COMMERCIAL

## 2025-03-03 VITALS
WEIGHT: 150 LBS | BODY MASS INDEX: 27.44 KG/M2 | HEART RATE: 68 BPM | DIASTOLIC BLOOD PRESSURE: 74 MMHG | SYSTOLIC BLOOD PRESSURE: 138 MMHG

## 2025-03-03 DIAGNOSIS — N95.8 GENITOURINARY SYNDROME OF MENOPAUSE: ICD-10-CM

## 2025-03-03 DIAGNOSIS — R31.21 ASYMPTOMATIC MICROSCOPIC HEMATURIA: ICD-10-CM

## 2025-03-03 DIAGNOSIS — Z12.4 CERVICAL CANCER SCREENING: ICD-10-CM

## 2025-03-03 DIAGNOSIS — N39.46 MIXED INCONTINENCE: Primary | ICD-10-CM

## 2025-03-03 DIAGNOSIS — N81.9 FEMALE GENITAL PROLAPSE, UNSPECIFIED TYPE: ICD-10-CM

## 2025-03-03 DIAGNOSIS — Z13.9 ENCOUNTER FOR HEALTH-RELATED SCREENING: ICD-10-CM

## 2025-03-03 LAB
ALBUMIN UR-MCNC: NEGATIVE MG/DL
APPEARANCE UR: CLEAR
BACTERIA #/AREA URNS HPF: ABNORMAL /HPF
BILIRUB UR QL STRIP: NEGATIVE
COLOR UR AUTO: YELLOW
GLUCOSE UR STRIP-MCNC: NEGATIVE MG/DL
HGB UR QL STRIP: NEGATIVE
KETONES UR STRIP-MCNC: NEGATIVE MG/DL
LEUKOCYTE ESTERASE UR QL STRIP: ABNORMAL
NITRATE UR QL: NEGATIVE
PH UR STRIP: 6 [PH] (ref 5–7)
RBC #/AREA URNS AUTO: ABNORMAL /HPF
SP GR UR STRIP: 1.01 (ref 1–1.03)
UROBILINOGEN UR STRIP-ACNC: 0.2 E.U./DL
WBC #/AREA URNS AUTO: ABNORMAL /HPF

## 2025-03-03 PROCEDURE — 3075F SYST BP GE 130 - 139MM HG: CPT | Performed by: OBSTETRICS & GYNECOLOGY

## 2025-03-03 PROCEDURE — 3078F DIAST BP <80 MM HG: CPT | Performed by: OBSTETRICS & GYNECOLOGY

## 2025-03-03 PROCEDURE — 99214 OFFICE O/P EST MOD 30 MIN: CPT | Performed by: OBSTETRICS & GYNECOLOGY

## 2025-03-03 PROCEDURE — 81001 URINALYSIS AUTO W/SCOPE: CPT | Performed by: OBSTETRICS & GYNECOLOGY

## 2025-03-03 PROCEDURE — 87086 URINE CULTURE/COLONY COUNT: CPT | Performed by: OBSTETRICS & GYNECOLOGY

## 2025-03-03 RX ORDER — OLOPATADINE HYDROCHLORIDE 1 MG/ML
SOLUTION/ DROPS OPHTHALMIC
COMMUNITY

## 2025-03-03 RX ORDER — OMEPRAZOLE 20 MG/1
CAPSULE, DELAYED RELEASE ORAL
COMMUNITY
Start: 2024-09-26

## 2025-03-03 RX ORDER — FAMOTIDINE 20 MG/1
1 TABLET, FILM COATED ORAL
COMMUNITY

## 2025-03-03 RX ORDER — CYCLOSPORINE 0.5 MG/ML
1 EMULSION OPHTHALMIC 2 TIMES DAILY
COMMUNITY
Start: 2024-12-18

## 2025-03-03 RX ORDER — FLUTICASONE PROPIONATE 50 MCG
SPRAY, SUSPENSION (ML) NASAL
COMMUNITY
Start: 2024-05-23

## 2025-03-03 RX ORDER — SODIUM CHLORIDE 5 %
OINTMENT (GRAM) OPHTHALMIC (EYE)
COMMUNITY
Start: 2024-12-18

## 2025-03-03 RX ORDER — ESTRADIOL 10 UG/1
10 TABLET, FILM COATED VAGINAL
Qty: 24 TABLET | Refills: 3 | Status: SHIPPED | OUTPATIENT
Start: 2025-03-03

## 2025-03-03 RX ORDER — FLUOCINOLONE ACETONIDE 0.11 MG/ML
OIL AURICULAR (OTIC)
COMMUNITY
Start: 2024-05-23

## 2025-03-03 RX ORDER — CHOLECALCIFEROL (VITAMIN D3) 50 MCG
1 TABLET ORAL
COMMUNITY
Start: 2025-01-21

## 2025-03-03 RX ORDER — PREDNISONE 20 MG/1
1 TABLET ORAL
COMMUNITY
Start: 2025-01-13

## 2025-03-03 RX ORDER — GABAPENTIN 300 MG/1
300 CAPSULE ORAL DAILY
COMMUNITY
Start: 2024-10-18

## 2025-03-03 RX ORDER — AZELASTINE HCL 205.5 UG/1
SPRAY NASAL
COMMUNITY

## 2025-03-03 RX ORDER — GABAPENTIN 100 MG/1
100 CAPSULE ORAL DAILY
COMMUNITY
Start: 2024-09-30

## 2025-03-03 NOTE — PROGRESS NOTES
Did not tolerate oxybutynin 2/2 dizziness,. Still having some urge incontinence/frequency. Concerned about starting merbetriq   Will start vaginal estrogen tablet   Normal exam, pap, mild prolapse   Gets primary care through outside clinic so JENNY sent, patient thinks she is UTD on    Urology referral placed due to persistant symptoms and hematruia     GYN Progress Note     CC: Follow up for urinary urgency and OAB    HPI: Sharon Mendoza is a 70 year old  who presents to clinic for follow up due to a history of over active bladder and mixed incontinence. She was last seen for this concern in 2024 and at that time we discussed starting Mybetriq for OAB but it was denied by her insurance so Oxybutynin XL was started instead. She took this for two weeks but had to stop due to dizziness and fatigue. She continues to have urgency and leakage with urgency as well as stress incontinence. She is also concerned because she was told she had prolapse in the past.     O: /74 (BP Location: Right arm, Patient Position: Sitting)   Pulse 68   Wt 68 kg (150 lb)   BMI 27.44 kg/m      General: Patient alert and oriented, no acute distress  CV: no peripheral edema or cyanosis  Resp: normal respiratory effort and equal lung expansion  Abdomen: non-tender to light and deep palpation, no masses, organomegaly or hernia  : normal external genitalia without lesions.Vaginal mucosa atrophic in appearance, scant physiologic discharge. Cervix appears grossly normal.  Uterus normal size and contour, non-tender. No adnexal fullness or masses present. Mild prolapse (anterior, apical, and posterior) noted with valsalva.   Ext: non-tender, no edema      Assessment and plan:   Sharon Mendoza is a 70 year old  who presents to clinic for follow up due to a history of the following concerns:   (N39.46) Mixed incontinence  (primary encounter diagnosis)  -Patient has had both urge and stress incontinence as well as symptoms of OAB. Has  seen urology in the past who offered pelvic floor PT which she has not done   -We trailed Oxybutynin but she was not able to tolerate, we discussed the option to try to do a PA to get Myrbetriq covered but she is concerned about starting another medication. We discussed the different side effect profile and that it is typically well tolerated compared to the Oxybutynin but that Myrbetriq often isn't covered by insurance without failing other agents.   -We also discussed that vaginal estrogen can be helpful for overactive bladder (but likely won't resolve the symptoms fully). She feels more comfortable trying this after discussing risks/benefits. We reviewed that she should place the tablet vaginally and only twice per week.   -Given her ongoing urinary sx, I also recommend she follow up with urology.  Plan: UA with Microscopic reflex to Culture - lab         collect, UA Microscopic with Reflex to Culture,        Urine Culture Aerobic Bacterial, Adult Urology          Referral, estradiol (VAGIFEM) 10 MCG         TABS vaginal tablet          (Z13.9) Encounter for health-related screening  -Patient states that she completes all of her health maintenance (colonoscopies, blood work, mammograms, etc) at an outside clinic (but doesn't recall the name and nothing in Care Everywhere except her mammogram results from this year)   -We discussed the important of health care screenings and she was encourage to continue to follow with her chosen PCP every year to make sure she is up to date.       (R31.21) Asymptomatic microscopic hematuria  -New hematuria noted on UA today, will defer for urology if further work up or just repeat UA is indicated   Plan: Adult Urology  Referral            (Z12.4) Cervical cancer screening  -Discussed with patient criteria to exit pap smear screening and given only one prior pap with HPV (that we can see in her chart), she does not meet criteria and would need one more pap so  this was competed today.   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   Plan: estradiol (VAGIFEM) 10 MCG TABS vaginal tablet            (N81.9) Female genital prolapse, unspecified type  -We discussed that she has mild prolapse on exam and this is a common finding that does not require further treatment if not bothersome.   -She is not having any symptoms of prolapse except as it relates to her incontinence, will follow up with urology/uroGYN         Dispo: RTC as needed     Michelle Wilhelm MD

## 2025-03-03 NOTE — PROGRESS NOTES
Failed Oxybutinin due to dizziness,   OAB sx better due to fasting during ramadon but still bothersome   Cologuard at [facility]

## 2025-03-04 LAB — BACTERIA UR CULT: NO GROWTH

## 2025-03-05 ENCOUNTER — APPOINTMENT (OUTPATIENT)
Dept: INTERPRETER SERVICES | Facility: CLINIC | Age: 70
End: 2025-03-05
Payer: COMMERCIAL

## 2025-03-10 LAB
BKR AP ASSOCIATED HPV REPORT: ABNORMAL
BKR LAB AP GYN ADEQUACY: ABNORMAL
BKR LAB AP GYN INTERPRETATION: ABNORMAL
BKR LAB AP PREVIOUS ABNORMAL: ABNORMAL
PATH REPORT.COMMENTS IMP SPEC: ABNORMAL
PATH REPORT.COMMENTS IMP SPEC: ABNORMAL
PATH REPORT.RELEVANT HX SPEC: ABNORMAL

## 2025-03-11 LAB
HPV HR 12 DNA CVX QL NAA+PROBE: NEGATIVE
HPV16 DNA CVX QL NAA+PROBE: NEGATIVE
HPV18 DNA CVX QL NAA+PROBE: NEGATIVE
HUMAN PAPILLOMA VIRUS FINAL DIAGNOSIS: NORMAL

## 2025-03-13 ENCOUNTER — PATIENT OUTREACH (OUTPATIENT)
Dept: OBGYN | Facility: CLINIC | Age: 70
End: 2025-03-13
Payer: COMMERCIAL

## 2025-03-13 PROBLEM — R87.610 ASCUS OF CERVIX WITH NEGATIVE HIGH RISK HPV: Status: ACTIVE | Noted: 2025-03-13

## 2025-03-13 PROBLEM — R87.610 ASCUS OF CERVIX WITH NEGATIVE HIGH RISK HPV: Status: ACTIVE | Noted: 2025-03-03

## 2025-06-12 ENCOUNTER — OFFICE VISIT (OUTPATIENT)
Dept: UROLOGY | Facility: CLINIC | Age: 70
End: 2025-06-12
Attending: OBSTETRICS & GYNECOLOGY
Payer: COMMERCIAL

## 2025-06-12 VITALS
DIASTOLIC BLOOD PRESSURE: 68 MMHG | BODY MASS INDEX: 27.25 KG/M2 | SYSTOLIC BLOOD PRESSURE: 105 MMHG | WEIGHT: 149 LBS | HEART RATE: 70 BPM

## 2025-06-12 DIAGNOSIS — N95.8 GENITOURINARY SYNDROME OF MENOPAUSE: ICD-10-CM

## 2025-06-12 DIAGNOSIS — N39.46 MIXED INCONTINENCE: Primary | ICD-10-CM

## 2025-06-12 DIAGNOSIS — R31.21 ASYMPTOMATIC MICROSCOPIC HEMATURIA: ICD-10-CM

## 2025-06-12 LAB
ALBUMIN UR-MCNC: NEGATIVE MG/DL
APPEARANCE UR: CLEAR
BILIRUB UR QL STRIP: NEGATIVE
COLOR UR AUTO: YELLOW
GLUCOSE UR STRIP-MCNC: NEGATIVE MG/DL
HGB UR QL STRIP: NEGATIVE
KETONES UR STRIP-MCNC: NEGATIVE MG/DL
LEUKOCYTE ESTERASE UR QL STRIP: NEGATIVE
NITRATE UR QL: NEGATIVE
PH UR STRIP: 6.5 [PH] (ref 5–7)
SP GR UR STRIP: <=1.005 (ref 1–1.03)
UROBILINOGEN UR STRIP-ACNC: 0.2 E.U./DL

## 2025-06-12 RX ORDER — ESTRADIOL 0.1 MG/G
2 CREAM VAGINAL
Qty: 42.5 G | Refills: 3 | Status: SHIPPED | OUTPATIENT
Start: 2025-06-13

## 2025-06-12 RX ORDER — CETIRIZINE HYDROCHLORIDE 10 MG/1
10 TABLET ORAL DAILY
COMMUNITY
Start: 2025-05-06

## 2025-06-12 RX ORDER — HYDROXYZINE HYDROCHLORIDE 10 MG/1
10 TABLET, FILM COATED ORAL
COMMUNITY
Start: 2025-06-03

## 2025-06-12 RX ORDER — B-COMPLEX WITH VITAMIN C
TABLET ORAL
COMMUNITY
Start: 2025-01-21

## 2025-06-12 RX ORDER — SOLIFENACIN SUCCINATE 10 MG/1
10 TABLET, FILM COATED ORAL DAILY
Qty: 90 TABLET | Refills: 1 | Status: SHIPPED | OUTPATIENT
Start: 2025-06-12

## 2025-06-12 RX ORDER — MONTELUKAST SODIUM 10 MG/1
1 TABLET ORAL DAILY
COMMUNITY
Start: 2025-06-11

## 2025-06-12 RX ORDER — CHLORHEXIDINE GLUCONATE ORAL RINSE 1.2 MG/ML
15 SOLUTION DENTAL
COMMUNITY
Start: 2025-05-15

## 2025-06-12 NOTE — PATIENT INSTRUCTIONS
"Harshal Mendoza, it was nice to meet you!    Thank you for allowing us the privilege of caring for you. We hope we provided you with the excellent service you deserve.   Please let us know if there is anything else we can do for you.  We want you to be completely satisfied with your care experience.    To ensure the quality of our services, you may be receiving a patient satisfaction survey from an independent patient satisfaction monitoring company.    The greatest compliment you can give is a \"Likely to Recommend.\"    Your visit was with GIL Rebolledo CNP today.    Instructions per today's visit:     Start Estrogen cream place pea size amount vaginally three times a week at bedtime   Start Vesicare 10 mg 1 tablet a day   Moderation of fluids   Consider returning for in office cystoscopy for possible Botox or other treatments     ___________________________________________________________________________  Important contact and scheduling information:  Please call our contact center at [phone] to schedule your next appointments or to reach our nurse triage line.  Please call during clinic hours Monday through Friday 8:00a - 4:30p if you have questions.  You can contact us anytime via [employer] and we will reply during clinic hours.    Lab results will be communicated through My Chart or letter (if My Chart not used). Please call the clinic if you have not received communication after 1 week or if you have any questions.?  __________________________________________________________________________    If labs were ordered today:    Please make an appointment to have them drawn at your convenience.     To schedule the Lab Appointment using [employer]:  Select \"Schedule an Appointment\"  Select \"Lab Only\"  Answer simple questions about where you would like to be seen and your type of insurance  For \"Which locations work for you?, select the location and set up the appointment.      "

## 2025-06-12 NOTE — PROGRESS NOTES
S: Sharon Mendoza is a pleasant 70 year old female  with history of GERD requesting to be seen by Michelle Wilhelm MD for incontinence.     History of Present Illness-  Ms. Mendoza reports experiencing urinary incontinence for a very long time, with worsening symptoms over the past 5 years. She describes two types of incontinence: stress incontinence, which occurs with coughing, laughing, or sneezing, and urgency incontinence, characterized by a strong urge to urinate with minimal bladder content, leading to accidents if she cannot reach the bathroom in time. She experiences bedwetting at night despite attempting to urinate before sleeping. She has previously consulted OBGYN doctors and other specialists regarding this issue. She was prescribed oxybutynin, which caused dizziness and tiredness, and underwent physical therapy, which initially seemed to help but eventually did not result in lasting improvement. In 2019, a doctor mentioned that her bladder had moved from its place, but subsequent consultations did not confirm this finding. She has concerns about potential side effects of treatments like Botox injections and requests tests before proceeding with such treatments.    Past Obstetric History  Ms. Mendoza has been pregnant six times and delivered all her children vaginally.    Medication Concerns  Ms. Mendoza expresses a desire for medications that would not cause additional problems, referencing past experiences with side effects from medications. She was prescribed estrogen cream by an OBGYN but has not used it, preferring to consult a specialist before starting any new medication. She recalls being given pills in the past that caused adverse symptoms.    Fluid Intake  Ms. Mendoza drinks tea in the morning and throughout the day, noting that tea increases her urination frequency. She also drinks water, though not excessively. Recently, she has been forcing herself to drink more water based on previous medical advice, but  she is unsure if this is necessary.History of Present Illness-  Ms. Mendoza reports experiencing urinary incontinence for a very long time, with worsening symptoms over the past 5 years. She describes two types of incontinence: stress incontinence, which occurs with coughing, laughing, or sneezing, and urgency incontinence, characterized by a strong urge to urinate with minimal bladder content, leading to accidents if she cannot reach the bathroom in time. She experiences bedwetting at night despite attempting to urinate before sleeping. She has previously consulted OBGYN doctors and other specialists regarding this issue. She was prescribed oxybutynin, which caused dizziness and tiredness, and underwent physical therapy, which initially seemed to help but eventually did not result in lasting improvement. In 2019, a doctor mentioned that her bladder had moved from its place, but subsequent consultations did not confirm this finding. She has concerns about potential side effects of treatments like Botox injections and requests tests before proceeding with such treatments.    Past Obstetric History  Ms. Mendoza has been pregnant six times and delivered all her children vaginally.    Medication Concerns  Ms. Mendoza expresses a desire for medications that would not cause additional problems, referencing past experiences with side effects from medications. She was prescribed estrogen cream by an OBGYN but has not used it, preferring to consult a specialist before starting any new medication. She recalls being given pills in the past that caused adverse symptoms.    Fluid Intake  Ms. Mendoza drinks tea in the morning and throughout the day, noting that tea increases her urination frequency. She also drinks water, though not excessively. Recently, she has been forcing herself to drink more water based on previous medical advice, but she is unsure if this is necessary.      Current Outpatient Medications   Medication Sig Dispense  Refill    azelastine (ASTEPRO) 0.15 % nasal spray       cetirizine (ZYRTEC) 10 MG tablet Take 10 mg by mouth daily.      chlorhexidine (PERIDEX) 0.12 % solution Take 15 mLs by mouth.      [START ON 6/13/2025] estradiol (ESTRACE) 0.1 MG/GM vaginal cream Place 2 g vaginally three times a week. 42.5 g 3    hydrOXYzine HCl (ATARAX) 10 MG tablet Take 10 mg by mouth.      montelukast (SINGULAIR) 10 MG tablet Take 1 tablet by mouth daily.      olopatadine (PATANOL) 0.1 % ophthalmic solution       omeprazole (PRILOSEC) 20 MG DR capsule TAKE 1 CAPSULE BY ORAL ROUTE ONCE TAKE 30 MINUTES BEFORE BREAKFAST      RESTASIS 0.05 % ophthalmic emulsion Place 1 drop into both eyes 2 times daily.      sodium chloride (SANTO 128) 5 % ophthalmic ointment APPLY A SMALL AMOUNT INTO BOTH EYES AT BEDTIME      solifenacin (VESICARE) 10 MG tablet Take 1 tablet (10 mg) by mouth daily. 90 tablet 1    VITAMIN  B COMPLEX tablet take 1 tablet a day      vitamin D3 (CHOLECALCIFEROL) 50 mcg (2000 units) tablet Take 1 tablet by mouth daily at 2 pm.      fluticasone (FLONASE) 50 MCG/ACT nasal spray        Allergies   Allergen Reactions    Seasonal Allergies      Past Medical History:   Diagnosis Date    Gastro-oesophageal reflux disease      Past Surgical History:   Procedure Laterality Date    NO HISTORY OF SURGERY        Family History   Problem Relation Age of Onset    Breast Cancer No family hx of     Ovarian Cancer No family hx of     Colon Cancer No family hx of      Social History     Socioeconomic History    Marital status:      Spouse name: None    Number of children: None    Years of education: None    Highest education level: None   Tobacco Use    Smoking status: Never     Passive exposure: Never    Smokeless tobacco: Never   Vaping Use    Vaping status: Never Used   Substance and Sexual Activity    Alcohol use: No    Drug use: No    Sexual activity: Not Currently     Social Drivers of Health     Financial Resource Strain: Not on File  (2021)    Received from [employer]    Financial Resource Strain     Financial Resource Strain: 0   Food Insecurity: Not on File (2024)    Received from [employer]    Food Insecurity     Food: 0   Transportation Needs: Not on File (2021)    Received from [employer]    Transportation Needs     Transportation: 0   Physical Activity: Not on File (2021)    Received from [employer]    Physical Activity     Physical Activity: 0   Stress: Not on File (2021)    Received from [employer]    Stress     Stress: 0   Social Connections: Not on File (2024)    Received from [employer]    Social Connections     Connectedness: 0   Housing Stability: Not on File (2021)    Received from [employer]    Housing Stability     Housin       REVIEW OF SYSTEMS  =================  C: NEGATIVE for fever, chills, change in weight  I: NEGATIVE for worrisome rashes, moles or lesions  E/M: NEGATIVE for ear, mouth and throat problems  R: NEGATIVE for significant cough or SHORTNESS OF BREATH  CV:  NEGATIVE for chest pain, palpitations or peripheral edema  GI: NEGATIVE for nausea, abdominal pain, heartburn, or change in bowel habits  NEURO: NEGATIVE numbness/weakness  : see HPI  PSYCH: NEGATIVE depression/anxiety  LYmph: no new enlarged lymph nodes  Ortho: no new trauma/movements    Results for orders placed or performed in visit on 25   UA Macroscopic with reflex to Microscopic and Culture     Status: Normal    Specimen: Urine, Midstream   Result Value Ref Range    Color Urine Yellow Colorless, Straw, Light Yellow, Yellow    Appearance Urine Clear Clear    Glucose Urine Negative Negative mg/dL    Bilirubin Urine Negative Negative    Ketones Urine Negative Negative mg/dL    Specific Gravity Urine <=1.005 1.003 - 1.035    Blood Urine Negative Negative    pH Urine 6.5 5.0 - 7.0    Protein Albumin Urine Negative Negative mg/dL    Urobilinogen Urine 0.2 0.2, 1.0 E.U./dL    Nitrite Urine Negative Negative    Leukocyte Esterase Urine Negative  Negative    Narrative    Microscopic not indicated         Physical Exam:  /68   Pulse 70   Wt 67.6 kg (149 lb)   BMI 27.25 kg/m     Patient is pleasant, in no acute distress, good general condition.  Heart:  negative, PMI normal  Lung: no evidence of respiratory distress    Abdomen: Soft, nondistended, non tender. No masses. No rebound or guarding.  PELVIC:               Normal external genitalia and introitus, moderate atrophic changes.   Urethra patent.   Stress incontinence was not demonstrated with coughing.               Mild prolapse (anterior, apical, and posterior) noted with valsalva.    Pelvic floor muscles of normal tonicity, non-tender.   Perineum grossly normal, no palpable masses.    Skin: Warm and dry.  No redness.  Neuro: grossly normal  Musculaskeletal: moving all extremities  Psych normal mood and affect  PVR: 32 ml    Assessment/Plan:   Assessment & Plan  Urinary incontinence (stress and urgency):  Different management options were discussed today with the patient including observation, Kegel exercises, dedicated pelvic floor physical therapy with biofeedback, medication therapy including anticholinergics and Myrbetriq, deferring bladder Botox injections, InterStim and potential surgery to a consult with a urology surgeon. The patient has elected to proceed with:  - Trial of Vesicare 10 mg q day, indication of use and side effects discused  - Moderation of fluids  - If not effective next step would be for in office cystoscopy to consider Botox.    Genitourinary syndrome of menopause  Discussed etiology of changes after menopause.  - Use estrogen cream three times a week to improve tissue health, dime size amount vaginally indication of use and side effects discussed.    GIL Rebolledo CNP  Consent was obtained from the patient to use an AI documentation tool in the creation of this note.  Voice recognition software was also used.  There may be associated transcribing errors.